# Patient Record
Sex: MALE | Race: BLACK OR AFRICAN AMERICAN | NOT HISPANIC OR LATINO | Employment: OTHER | URBAN - METROPOLITAN AREA
[De-identification: names, ages, dates, MRNs, and addresses within clinical notes are randomized per-mention and may not be internally consistent; named-entity substitution may affect disease eponyms.]

---

## 2018-01-03 ENCOUNTER — OFFICE VISIT (OUTPATIENT)
Dept: URGENT CARE | Facility: CLINIC | Age: 48
End: 2018-01-03

## 2018-01-03 VITALS
HEART RATE: 80 BPM | BODY MASS INDEX: 39.99 KG/M2 | WEIGHT: 270 LBS | TEMPERATURE: 100 F | OXYGEN SATURATION: 98 % | HEIGHT: 69 IN | RESPIRATION RATE: 18 BRPM | SYSTOLIC BLOOD PRESSURE: 147 MMHG | DIASTOLIC BLOOD PRESSURE: 94 MMHG

## 2018-01-03 DIAGNOSIS — R10.9 ABDOMINAL PAIN, UNSPECIFIED ABDOMINAL LOCATION: Primary | ICD-10-CM

## 2018-01-03 DIAGNOSIS — R10.31 RIGHT LOWER QUADRANT ABDOMINAL PAIN: ICD-10-CM

## 2018-01-03 LAB
BILIRUB UR QL STRIP: NEGATIVE
GLUCOSE UR QL STRIP: NEGATIVE
KETONES UR QL STRIP: NEGATIVE
LEUKOCYTE ESTERASE UR QL STRIP: NEGATIVE
PH, POC UA: 7.5 (ref 5–8)
POC BLOOD, URINE: NEGATIVE
POC NITRATES, URINE: NEGATIVE
PROT UR QL STRIP: NEGATIVE
SP GR UR STRIP: 1.01 (ref 1–1.03)
UROBILINOGEN UR STRIP-ACNC: NORMAL (ref 0.3–2.2)

## 2018-01-03 PROCEDURE — 99213 OFFICE O/P EST LOW 20 MIN: CPT | Mod: 25,S$GLB,, | Performed by: INTERNAL MEDICINE

## 2018-01-03 PROCEDURE — 81003 URINALYSIS AUTO W/O SCOPE: CPT | Mod: QW,S$GLB,, | Performed by: INTERNAL MEDICINE

## 2018-01-04 NOTE — PROGRESS NOTES
"Subjective:       Patient ID: Cristobal Zacarias is a 47 y.o. male.    Vitals:  height is 5' 9" (1.753 m) and weight is 122.5 kg (270 lb). His temperature is 99.8 °F (37.7 °C). His blood pressure is 147/94 (abnormal) and his pulse is 80. His respiration is 18 and oxygen saturation is 98%.     Chief Complaint: Abdominal Pain    Abdominal Pain   This is a new problem. The current episode started today. The onset quality is sudden. The problem occurs constantly. The most recent episode lasted 1 day. The problem has been gradually worsening. The pain is located in the right flank. The pain is at a severity of 10/10. The pain is severe. The quality of the pain is sharp and cramping. The abdominal pain radiates to the RLQ and right flank. Associated symptoms include constipation. Pertinent negatives include no diarrhea, dysuria, fever, hematochezia, melena, nausea or vomiting. The pain is aggravated by movement. The pain is relieved by nothing. Treatments tried: laxitive, ducalax, and citrate mag. The treatment provided no relief.     Review of Systems   Constitution: Positive for chills. Negative for fever.   Cardiovascular: Negative for chest pain.   Respiratory: Negative for shortness of breath.    Musculoskeletal: Negative for back pain.   Gastrointestinal: Positive for abdominal pain and constipation. Negative for diarrhea, hematochezia, melena, nausea and vomiting.   Genitourinary: Positive for incomplete emptying. Negative for dysuria.       Objective:      Physical Exam   Constitutional: He appears well-developed and well-nourished. He appears ill.   HENT:   Head: Normocephalic and atraumatic.   Eyes: Conjunctivae and EOM are normal. Pupils are equal, round, and reactive to light.   Neck: Normal range of motion. Neck supple.   Cardiovascular: Normal rate and regular rhythm.    Pulmonary/Chest: Effort normal and breath sounds normal.   Abdominal: There is tenderness. There is rebound and guarding.   RLQ tenderness "   Vitals reviewed.      Assessment:       1. Abdominal pain, unspecified abdominal location    2. Right lower quadrant abdominal pain        Plan:         Abdominal pain, unspecified abdominal location  -     POCT Urinalysis, Dipstick, Automated, W/O Scope    Right lower quadrant abdominal pain